# Patient Record
Sex: FEMALE | Race: WHITE | NOT HISPANIC OR LATINO
[De-identification: names, ages, dates, MRNs, and addresses within clinical notes are randomized per-mention and may not be internally consistent; named-entity substitution may affect disease eponyms.]

---

## 2017-03-20 ENCOUNTER — TRANSCRIPTION ENCOUNTER (OUTPATIENT)
Age: 32
End: 2017-03-20

## 2017-03-24 ENCOUNTER — TRANSCRIPTION ENCOUNTER (OUTPATIENT)
Age: 32
End: 2017-03-24

## 2020-03-05 ENCOUNTER — TRANSCRIPTION ENCOUNTER (OUTPATIENT)
Age: 35
End: 2020-03-05

## 2020-07-29 ENCOUNTER — TRANSCRIPTION ENCOUNTER (OUTPATIENT)
Age: 35
End: 2020-07-29

## 2020-08-13 ENCOUNTER — TRANSCRIPTION ENCOUNTER (OUTPATIENT)
Age: 35
End: 2020-08-13

## 2020-09-20 ENCOUNTER — TRANSCRIPTION ENCOUNTER (OUTPATIENT)
Age: 35
End: 2020-09-20

## 2020-10-23 ENCOUNTER — TRANSCRIPTION ENCOUNTER (OUTPATIENT)
Age: 35
End: 2020-10-23

## 2020-12-10 ENCOUNTER — TRANSCRIPTION ENCOUNTER (OUTPATIENT)
Age: 35
End: 2020-12-10

## 2020-12-19 ENCOUNTER — TRANSCRIPTION ENCOUNTER (OUTPATIENT)
Age: 35
End: 2020-12-19

## 2021-02-27 ENCOUNTER — TRANSCRIPTION ENCOUNTER (OUTPATIENT)
Age: 36
End: 2021-02-27

## 2021-03-25 PROBLEM — Z00.00 ENCOUNTER FOR PREVENTIVE HEALTH EXAMINATION: Status: ACTIVE | Noted: 2021-03-25

## 2021-04-05 ENCOUNTER — APPOINTMENT (OUTPATIENT)
Dept: OBGYN | Facility: CLINIC | Age: 36
End: 2021-04-05
Payer: COMMERCIAL

## 2021-04-05 VITALS
DIASTOLIC BLOOD PRESSURE: 78 MMHG | WEIGHT: 155 LBS | HEART RATE: 66 BPM | BODY MASS INDEX: 22.19 KG/M2 | SYSTOLIC BLOOD PRESSURE: 115 MMHG | HEIGHT: 70 IN

## 2021-04-05 DIAGNOSIS — Z31.69 ENCOUNTER FOR OTHER GENERAL COUNSELING AND ADVICE ON PROCREATION: ICD-10-CM

## 2021-04-05 DIAGNOSIS — B97.7 PAPILLOMAVIRUS AS THE CAUSE OF DISEASES CLASSIFIED ELSEWHERE: ICD-10-CM

## 2021-04-05 DIAGNOSIS — A60.00 HERPESVIRAL INFECTION OF UROGENITAL SYSTEM, UNSPECIFIED: ICD-10-CM

## 2021-04-05 DIAGNOSIS — Z72.0 TOBACCO USE: ICD-10-CM

## 2021-04-05 PROCEDURE — 99203 OFFICE O/P NEW LOW 30 MIN: CPT

## 2021-04-05 PROCEDURE — 99072 ADDL SUPL MATRL&STAF TM PHE: CPT

## 2021-04-05 RX ORDER — FAMOTIDINE 20 MG
TABLET ORAL
Refills: 0 | Status: ACTIVE | COMMUNITY

## 2021-04-17 PROBLEM — A60.00 GENITAL HERPES SIMPLEX, UNSPECIFIED SITE: Status: ACTIVE | Noted: 2021-04-17

## 2021-04-17 PROBLEM — Z31.69 ENCOUNTER FOR PRECONCEPTION CONSULTATION: Status: ACTIVE | Noted: 2021-04-17

## 2021-04-17 PROBLEM — B97.7 HIGH RISK HPV INFECTION: Status: ACTIVE | Noted: 2021-04-17

## 2021-06-10 ENCOUNTER — APPOINTMENT (OUTPATIENT)
Dept: ANTEPARTUM | Facility: CLINIC | Age: 36
End: 2021-06-10
Payer: COMMERCIAL

## 2021-06-10 ENCOUNTER — TRANSCRIPTION ENCOUNTER (OUTPATIENT)
Age: 36
End: 2021-06-10

## 2021-06-10 ENCOUNTER — ASOB RESULT (OUTPATIENT)
Age: 36
End: 2021-06-10

## 2021-06-10 PROCEDURE — 99072 ADDL SUPL MATRL&STAF TM PHE: CPT

## 2021-06-10 PROCEDURE — 76813 OB US NUCHAL MEAS 1 GEST: CPT

## 2021-07-21 ENCOUNTER — APPOINTMENT (OUTPATIENT)
Dept: ANTEPARTUM | Facility: CLINIC | Age: 36
End: 2021-07-21
Payer: COMMERCIAL

## 2021-07-21 ENCOUNTER — ASOB RESULT (OUTPATIENT)
Age: 36
End: 2021-07-21

## 2021-07-21 PROCEDURE — 99072 ADDL SUPL MATRL&STAF TM PHE: CPT

## 2021-07-21 PROCEDURE — 76811 OB US DETAILED SNGL FETUS: CPT

## 2021-08-12 ENCOUNTER — APPOINTMENT (OUTPATIENT)
Dept: ANTEPARTUM | Facility: CLINIC | Age: 36
End: 2021-08-12
Payer: COMMERCIAL

## 2021-08-12 ENCOUNTER — ASOB RESULT (OUTPATIENT)
Age: 36
End: 2021-08-12

## 2021-08-12 PROCEDURE — 76816 OB US FOLLOW-UP PER FETUS: CPT

## 2021-08-13 ENCOUNTER — APPOINTMENT (OUTPATIENT)
Dept: ANTEPARTUM | Facility: CLINIC | Age: 36
End: 2021-08-13
Payer: COMMERCIAL

## 2021-08-13 ENCOUNTER — ASOB RESULT (OUTPATIENT)
Age: 36
End: 2021-08-13

## 2021-08-13 PROCEDURE — 59000 AMNIOCENTESIS DIAGNOSTIC: CPT

## 2021-08-13 PROCEDURE — 76946 ECHO GUIDE FOR AMNIOCENTESIS: CPT

## 2021-09-09 ENCOUNTER — ASOB RESULT (OUTPATIENT)
Age: 36
End: 2021-09-09

## 2021-09-09 ENCOUNTER — APPOINTMENT (OUTPATIENT)
Dept: ANTEPARTUM | Facility: CLINIC | Age: 36
End: 2021-09-09
Payer: COMMERCIAL

## 2021-09-09 PROCEDURE — 76816 OB US FOLLOW-UP PER FETUS: CPT

## 2021-10-07 ENCOUNTER — ASOB RESULT (OUTPATIENT)
Age: 36
End: 2021-10-07

## 2021-10-07 ENCOUNTER — APPOINTMENT (OUTPATIENT)
Dept: ANTEPARTUM | Facility: CLINIC | Age: 36
End: 2021-10-07
Payer: COMMERCIAL

## 2021-10-07 PROCEDURE — 76816 OB US FOLLOW-UP PER FETUS: CPT

## 2021-11-04 ENCOUNTER — APPOINTMENT (OUTPATIENT)
Dept: ANTEPARTUM | Facility: CLINIC | Age: 36
End: 2021-11-04
Payer: COMMERCIAL

## 2021-11-04 ENCOUNTER — ASOB RESULT (OUTPATIENT)
Age: 36
End: 2021-11-04

## 2021-11-04 PROCEDURE — 76818 FETAL BIOPHYS PROFILE W/NST: CPT

## 2021-11-18 ENCOUNTER — ASOB RESULT (OUTPATIENT)
Age: 36
End: 2021-11-18

## 2021-11-18 ENCOUNTER — APPOINTMENT (OUTPATIENT)
Dept: ANTEPARTUM | Facility: CLINIC | Age: 36
End: 2021-11-18
Payer: COMMERCIAL

## 2021-11-18 PROCEDURE — 76819 FETAL BIOPHYS PROFIL W/O NST: CPT

## 2021-11-23 ENCOUNTER — APPOINTMENT (OUTPATIENT)
Dept: ANTEPARTUM | Facility: CLINIC | Age: 36
End: 2021-11-23
Payer: COMMERCIAL

## 2021-11-23 ENCOUNTER — ASOB RESULT (OUTPATIENT)
Age: 36
End: 2021-11-23

## 2021-11-23 PROCEDURE — 76818 FETAL BIOPHYS PROFILE W/NST: CPT

## 2021-11-30 ENCOUNTER — APPOINTMENT (OUTPATIENT)
Dept: ANTEPARTUM | Facility: CLINIC | Age: 36
End: 2021-11-30
Payer: COMMERCIAL

## 2021-11-30 ENCOUNTER — ASOB RESULT (OUTPATIENT)
Age: 36
End: 2021-11-30

## 2021-11-30 PROCEDURE — 76818 FETAL BIOPHYS PROFILE W/NST: CPT

## 2021-12-07 ENCOUNTER — ASOB RESULT (OUTPATIENT)
Age: 36
End: 2021-12-07

## 2021-12-07 ENCOUNTER — APPOINTMENT (OUTPATIENT)
Dept: ANTEPARTUM | Facility: CLINIC | Age: 36
End: 2021-12-07
Payer: COMMERCIAL

## 2021-12-07 PROCEDURE — 76819 FETAL BIOPHYS PROFIL W/O NST: CPT

## 2021-12-14 ENCOUNTER — ASOB RESULT (OUTPATIENT)
Age: 36
End: 2021-12-14

## 2021-12-14 ENCOUNTER — APPOINTMENT (OUTPATIENT)
Dept: ANTEPARTUM | Facility: CLINIC | Age: 36
End: 2021-12-14
Payer: COMMERCIAL

## 2021-12-14 PROCEDURE — 76818 FETAL BIOPHYS PROFILE W/NST: CPT

## 2021-12-17 ENCOUNTER — TRANSCRIPTION ENCOUNTER (OUTPATIENT)
Age: 36
End: 2021-12-17

## 2021-12-21 ENCOUNTER — APPOINTMENT (OUTPATIENT)
Dept: ANTEPARTUM | Facility: CLINIC | Age: 36
End: 2021-12-21

## 2021-12-22 ENCOUNTER — TRANSCRIPTION ENCOUNTER (OUTPATIENT)
Age: 36
End: 2021-12-22

## 2021-12-23 ENCOUNTER — RESULT REVIEW (OUTPATIENT)
Age: 36
End: 2021-12-23

## 2021-12-23 ENCOUNTER — APPOINTMENT (OUTPATIENT)
Age: 36
End: 2021-12-23

## 2021-12-23 ENCOUNTER — INPATIENT (INPATIENT)
Facility: HOSPITAL | Age: 36
LOS: 2 days | Discharge: HOME | End: 2021-12-26
Attending: OBSTETRICS & GYNECOLOGY | Admitting: OBSTETRICS & GYNECOLOGY
Payer: COMMERCIAL

## 2021-12-23 VITALS — HEIGHT: 69 IN | WEIGHT: 190.04 LBS

## 2021-12-23 LAB
ALBUMIN SERPL ELPH-MCNC: 4 G/DL — SIGNIFICANT CHANGE UP (ref 3.3–5)
ALBUMIN SERPL ELPH-MCNC: 4 G/DL — SIGNIFICANT CHANGE UP (ref 3.3–5)
ALP SERPL-CCNC: 181 U/L — HIGH (ref 40–120)
ALP SERPL-CCNC: SIGNIFICANT CHANGE UP (ref 40–120)
ALT FLD-CCNC: 81 U/L — HIGH (ref 10–45)
ALT FLD-CCNC: SIGNIFICANT CHANGE UP (ref 10–45)
ANION GAP SERPL CALC-SCNC: 14 MMOL/L — SIGNIFICANT CHANGE UP (ref 5–17)
ANION GAP SERPL CALC-SCNC: 16 MMOL/L — SIGNIFICANT CHANGE UP (ref 5–17)
APTT BLD: 29.2 SEC — SIGNIFICANT CHANGE UP (ref 27.5–35.5)
AST SERPL-CCNC: 85 U/L — HIGH (ref 10–40)
AST SERPL-CCNC: SIGNIFICANT CHANGE UP (ref 10–40)
BILIRUB SERPL-MCNC: 0.3 MG/DL — SIGNIFICANT CHANGE UP (ref 0.2–1.2)
BILIRUB SERPL-MCNC: 0.4 MG/DL — SIGNIFICANT CHANGE UP (ref 0.2–1.2)
BLD GP AB SCN SERPL QL: NEGATIVE — SIGNIFICANT CHANGE UP
BUN SERPL-MCNC: 8 MG/DL — SIGNIFICANT CHANGE UP (ref 7–23)
BUN SERPL-MCNC: 9 MG/DL — SIGNIFICANT CHANGE UP (ref 7–23)
CALCIUM SERPL-MCNC: 9.3 MG/DL — SIGNIFICANT CHANGE UP (ref 8.4–10.5)
CALCIUM SERPL-MCNC: 9.4 MG/DL — SIGNIFICANT CHANGE UP (ref 8.4–10.5)
CHLORIDE SERPL-SCNC: 101 MMOL/L — SIGNIFICANT CHANGE UP (ref 96–108)
CHLORIDE SERPL-SCNC: 102 MMOL/L — SIGNIFICANT CHANGE UP (ref 96–108)
CO2 SERPL-SCNC: 20 MMOL/L — LOW (ref 22–31)
CO2 SERPL-SCNC: 21 MMOL/L — LOW (ref 22–31)
CREAT ?TM UR-MCNC: 37 MG/DL — SIGNIFICANT CHANGE UP
CREAT SERPL-MCNC: 0.78 MG/DL — SIGNIFICANT CHANGE UP (ref 0.5–1.3)
CREAT SERPL-MCNC: 0.82 MG/DL — SIGNIFICANT CHANGE UP (ref 0.5–1.3)
FIBRINOGEN PPP-MCNC: 439 MG/DL — HIGH (ref 258–438)
GLUCOSE SERPL-MCNC: 67 MG/DL — LOW (ref 70–99)
GLUCOSE SERPL-MCNC: 70 MG/DL — SIGNIFICANT CHANGE UP (ref 70–99)
HCT VFR BLD CALC: 40.4 % — SIGNIFICANT CHANGE UP (ref 34.5–45)
HGB BLD-MCNC: 13.8 G/DL — SIGNIFICANT CHANGE UP (ref 11.5–15.5)
INR BLD: 0.85 — LOW (ref 0.88–1.16)
LDH SERPL L TO P-CCNC: SIGNIFICANT CHANGE UP (ref 50–242)
MCHC RBC-ENTMCNC: 30.9 PG — SIGNIFICANT CHANGE UP (ref 27–34)
MCHC RBC-ENTMCNC: 34.2 GM/DL — SIGNIFICANT CHANGE UP (ref 32–36)
MCV RBC AUTO: 90.4 FL — SIGNIFICANT CHANGE UP (ref 80–100)
NRBC # BLD: 0 /100 WBCS — SIGNIFICANT CHANGE UP (ref 0–0)
PLATELET # BLD AUTO: 227 K/UL — SIGNIFICANT CHANGE UP (ref 150–400)
POTASSIUM SERPL-MCNC: 4 MMOL/L — SIGNIFICANT CHANGE UP (ref 3.5–5.3)
POTASSIUM SERPL-MCNC: SIGNIFICANT CHANGE UP (ref 3.5–5.3)
POTASSIUM SERPL-SCNC: 4 MMOL/L — SIGNIFICANT CHANGE UP (ref 3.5–5.3)
POTASSIUM SERPL-SCNC: SIGNIFICANT CHANGE UP (ref 3.5–5.3)
PROT ?TM UR-MCNC: <4 MG/DL — SIGNIFICANT CHANGE UP (ref 0–12)
PROT ?TM UR-MCNC: <4 MG/DL — SIGNIFICANT CHANGE UP (ref 0–12)
PROT SERPL-MCNC: 7.1 G/DL — SIGNIFICANT CHANGE UP (ref 6–8.3)
PROT SERPL-MCNC: 7.3 G/DL — SIGNIFICANT CHANGE UP (ref 6–8.3)
PROT/CREAT UR-RTO: SIGNIFICANT CHANGE UP (ref 0–0.2)
PROTHROM AB SERPL-ACNC: 10.3 SEC — LOW (ref 10.6–13.6)
RBC # BLD: 4.47 M/UL — SIGNIFICANT CHANGE UP (ref 3.8–5.2)
RBC # FLD: 12.9 % — SIGNIFICANT CHANGE UP (ref 10.3–14.5)
RH IG SCN BLD-IMP: POSITIVE — SIGNIFICANT CHANGE UP
SODIUM SERPL-SCNC: 136 MMOL/L — SIGNIFICANT CHANGE UP (ref 135–145)
SODIUM SERPL-SCNC: 138 MMOL/L — SIGNIFICANT CHANGE UP (ref 135–145)
URATE SERPL-MCNC: 5.5 MG/DL — SIGNIFICANT CHANGE UP (ref 2.5–7)
WBC # BLD: 7.2 K/UL — SIGNIFICANT CHANGE UP (ref 3.8–10.5)
WBC # FLD AUTO: 7.2 K/UL — SIGNIFICANT CHANGE UP (ref 3.8–10.5)

## 2021-12-23 PROCEDURE — 88307 TISSUE EXAM BY PATHOLOGIST: CPT | Mod: 26

## 2021-12-23 RX ORDER — MAGNESIUM SULFATE 500 MG/ML
4 VIAL (ML) INJECTION ONCE
Refills: 0 | Status: DISCONTINUED | OUTPATIENT
Start: 2021-12-23 | End: 2021-12-23

## 2021-12-23 RX ORDER — ENOXAPARIN SODIUM 100 MG/ML
40 INJECTION SUBCUTANEOUS EVERY 24 HOURS
Refills: 0 | Status: DISCONTINUED | OUTPATIENT
Start: 2021-12-24 | End: 2021-12-26

## 2021-12-23 RX ORDER — SODIUM CHLORIDE 9 MG/ML
1000 INJECTION, SOLUTION INTRAVENOUS
Refills: 0 | Status: DISCONTINUED | OUTPATIENT
Start: 2021-12-23 | End: 2021-12-23

## 2021-12-23 RX ORDER — OXYTOCIN 10 UNIT/ML
333.33 VIAL (ML) INJECTION
Qty: 20 | Refills: 0 | Status: DISCONTINUED | OUTPATIENT
Start: 2021-12-23 | End: 2021-12-23

## 2021-12-23 RX ORDER — CHLORHEXIDINE GLUCONATE 213 G/1000ML
1 SOLUTION TOPICAL DAILY
Refills: 0 | Status: DISCONTINUED | OUTPATIENT
Start: 2021-12-23 | End: 2021-12-23

## 2021-12-23 RX ORDER — AMPICILLIN TRIHYDRATE 250 MG
2 CAPSULE ORAL ONCE
Refills: 0 | Status: DISCONTINUED | OUTPATIENT
Start: 2021-12-23 | End: 2021-12-23

## 2021-12-23 RX ORDER — OXYTOCIN 10 UNIT/ML
333.33 VIAL (ML) INJECTION
Qty: 20 | Refills: 0 | Status: DISCONTINUED | OUTPATIENT
Start: 2021-12-23 | End: 2021-12-26

## 2021-12-23 RX ORDER — CITRIC ACID/SODIUM CITRATE 300-500 MG
15 SOLUTION, ORAL ORAL EVERY 6 HOURS
Refills: 0 | Status: DISCONTINUED | OUTPATIENT
Start: 2021-12-23 | End: 2021-12-23

## 2021-12-23 RX ORDER — SODIUM CHLORIDE 9 MG/ML
1000 INJECTION, SOLUTION INTRAVENOUS ONCE
Refills: 0 | Status: DISCONTINUED | OUTPATIENT
Start: 2021-12-23 | End: 2021-12-23

## 2021-12-23 RX ORDER — IBUPROFEN 200 MG
600 TABLET ORAL EVERY 6 HOURS
Refills: 0 | Status: COMPLETED | OUTPATIENT
Start: 2021-12-23 | End: 2022-11-21

## 2021-12-23 RX ORDER — OXYCODONE HYDROCHLORIDE 5 MG/1
5 TABLET ORAL
Refills: 0 | Status: COMPLETED | OUTPATIENT
Start: 2021-12-23 | End: 2021-12-30

## 2021-12-23 RX ORDER — NALOXONE HYDROCHLORIDE 4 MG/.1ML
0.1 SPRAY NASAL
Refills: 0 | Status: DISCONTINUED | OUTPATIENT
Start: 2021-12-23 | End: 2021-12-23

## 2021-12-23 RX ORDER — SIMETHICONE 80 MG/1
80 TABLET, CHEWABLE ORAL EVERY 4 HOURS
Refills: 0 | Status: DISCONTINUED | OUTPATIENT
Start: 2021-12-23 | End: 2021-12-26

## 2021-12-23 RX ORDER — MAGNESIUM SULFATE 500 MG/ML
2 VIAL (ML) INJECTION
Qty: 40 | Refills: 0 | Status: DISCONTINUED | OUTPATIENT
Start: 2021-12-23 | End: 2021-12-24

## 2021-12-23 RX ORDER — TETANUS TOXOID, REDUCED DIPHTHERIA TOXOID AND ACELLULAR PERTUSSIS VACCINE, ADSORBED 5; 2.5; 8; 8; 2.5 [IU]/.5ML; [IU]/.5ML; UG/.5ML; UG/.5ML; UG/.5ML
0.5 SUSPENSION INTRAMUSCULAR ONCE
Refills: 0 | Status: DISCONTINUED | OUTPATIENT
Start: 2021-12-23 | End: 2021-12-26

## 2021-12-23 RX ORDER — ACETAMINOPHEN 500 MG
975 TABLET ORAL
Refills: 0 | Status: DISCONTINUED | OUTPATIENT
Start: 2021-12-23 | End: 2021-12-26

## 2021-12-23 RX ORDER — CEFAZOLIN SODIUM 1 G
2000 VIAL (EA) INJECTION ONCE
Refills: 0 | Status: COMPLETED | OUTPATIENT
Start: 2021-12-23 | End: 2021-12-23

## 2021-12-23 RX ORDER — SODIUM CHLORIDE 9 MG/ML
1000 INJECTION, SOLUTION INTRAVENOUS
Refills: 0 | Status: DISCONTINUED | OUTPATIENT
Start: 2021-12-23 | End: 2021-12-26

## 2021-12-23 RX ORDER — MAGNESIUM HYDROXIDE 400 MG/1
30 TABLET, CHEWABLE ORAL
Refills: 0 | Status: DISCONTINUED | OUTPATIENT
Start: 2021-12-23 | End: 2021-12-24

## 2021-12-23 RX ORDER — FAMOTIDINE 10 MG/ML
20 INJECTION INTRAVENOUS ONCE
Refills: 0 | Status: COMPLETED | OUTPATIENT
Start: 2021-12-23 | End: 2021-12-23

## 2021-12-23 RX ORDER — MORPHINE SULFATE 50 MG/1
0.2 CAPSULE, EXTENDED RELEASE ORAL ONCE
Refills: 0 | Status: DISCONTINUED | OUTPATIENT
Start: 2021-12-23 | End: 2021-12-23

## 2021-12-23 RX ORDER — CITRIC ACID/SODIUM CITRATE 300-500 MG
30 SOLUTION, ORAL ORAL ONCE
Refills: 0 | Status: COMPLETED | OUTPATIENT
Start: 2021-12-23 | End: 2021-12-23

## 2021-12-23 RX ORDER — DEXAMETHASONE 0.5 MG/5ML
4 ELIXIR ORAL EVERY 6 HOURS
Refills: 0 | Status: DISCONTINUED | OUTPATIENT
Start: 2021-12-23 | End: 2021-12-23

## 2021-12-23 RX ORDER — OXYCODONE HYDROCHLORIDE 5 MG/1
5 TABLET ORAL ONCE
Refills: 0 | Status: DISCONTINUED | OUTPATIENT
Start: 2021-12-23 | End: 2021-12-26

## 2021-12-23 RX ORDER — MAGNESIUM SULFATE 500 MG/ML
2 VIAL (ML) INJECTION
Qty: 40 | Refills: 0 | Status: DISCONTINUED | OUTPATIENT
Start: 2021-12-23 | End: 2021-12-23

## 2021-12-23 RX ORDER — KETOROLAC TROMETHAMINE 30 MG/ML
30 SYRINGE (ML) INJECTION EVERY 6 HOURS
Refills: 0 | Status: DISCONTINUED | OUTPATIENT
Start: 2021-12-23 | End: 2021-12-24

## 2021-12-23 RX ORDER — DIPHENHYDRAMINE HCL 50 MG
25 CAPSULE ORAL EVERY 6 HOURS
Refills: 0 | Status: DISCONTINUED | OUTPATIENT
Start: 2021-12-23 | End: 2021-12-26

## 2021-12-23 RX ORDER — AMPICILLIN TRIHYDRATE 250 MG
1 CAPSULE ORAL EVERY 4 HOURS
Refills: 0 | Status: DISCONTINUED | OUTPATIENT
Start: 2021-12-23 | End: 2021-12-23

## 2021-12-23 RX ORDER — ONDANSETRON 8 MG/1
4 TABLET, FILM COATED ORAL EVERY 6 HOURS
Refills: 0 | Status: DISCONTINUED | OUTPATIENT
Start: 2021-12-23 | End: 2021-12-23

## 2021-12-23 RX ORDER — LANOLIN
1 OINTMENT (GRAM) TOPICAL EVERY 6 HOURS
Refills: 0 | Status: DISCONTINUED | OUTPATIENT
Start: 2021-12-23 | End: 2021-12-26

## 2021-12-23 RX ADMIN — Medication 100 MILLIGRAM(S): at 18:25

## 2021-12-23 RX ADMIN — FAMOTIDINE 20 MILLIGRAM(S): 10 INJECTION INTRAVENOUS at 18:24

## 2021-12-23 RX ADMIN — Medication 975 MILLIGRAM(S): at 22:40

## 2021-12-23 RX ADMIN — Medication 975 MILLIGRAM(S): at 23:10

## 2021-12-23 RX ADMIN — Medication 30 MILLILITER(S): at 18:24

## 2021-12-23 RX ADMIN — Medication 30 MILLIGRAM(S): at 21:24

## 2021-12-23 RX ADMIN — Medication 30 MILLIGRAM(S): at 21:45

## 2021-12-23 RX ADMIN — Medication 50 GM/HR: at 20:34

## 2021-12-23 NOTE — PATIENT PROFILE OB - FALL HARM RISK - UNIVERSAL INTERVENTIONS
Bed in lowest position, wheels locked, appropriate side rails in place/Call bell, personal items and telephone in reach/Instruct patient to call for assistance before getting out of bed or chair/Non-slip footwear when patient is out of bed/[place] to call system/Physically safe environment - no spills, clutter or unnecessary equipment/Purposeful Proactive Rounding/Room/bathroom lighting operational, light cord in reach

## 2021-12-24 LAB
ALBUMIN SERPL ELPH-MCNC: 3.1 G/DL — LOW (ref 3.3–5)
ALP SERPL-CCNC: 129 U/L — HIGH (ref 40–120)
ALT FLD-CCNC: 60 U/L — HIGH (ref 10–45)
ANION GAP SERPL CALC-SCNC: 8 MMOL/L — SIGNIFICANT CHANGE UP (ref 5–17)
AST SERPL-CCNC: 76 U/L — HIGH (ref 10–40)
BASOPHILS # BLD AUTO: 0.03 K/UL — SIGNIFICANT CHANGE UP (ref 0–0.2)
BASOPHILS NFR BLD AUTO: 0.3 % — SIGNIFICANT CHANGE UP (ref 0–2)
BILIRUB SERPL-MCNC: 0.4 MG/DL — SIGNIFICANT CHANGE UP (ref 0.2–1.2)
BUN SERPL-MCNC: 7 MG/DL — SIGNIFICANT CHANGE UP (ref 7–23)
CALCIUM SERPL-MCNC: 7.5 MG/DL — LOW (ref 8.4–10.5)
CHLORIDE SERPL-SCNC: 99 MMOL/L — SIGNIFICANT CHANGE UP (ref 96–108)
CO2 SERPL-SCNC: 26 MMOL/L — SIGNIFICANT CHANGE UP (ref 22–31)
COVID-19 SPIKE DOMAIN AB INTERP: POSITIVE
COVID-19 SPIKE DOMAIN ANTIBODY RESULT: >250 U/ML — HIGH
CREAT SERPL-MCNC: 0.8 MG/DL — SIGNIFICANT CHANGE UP (ref 0.5–1.3)
EOSINOPHIL # BLD AUTO: 0.07 K/UL — SIGNIFICANT CHANGE UP (ref 0–0.5)
EOSINOPHIL NFR BLD AUTO: 0.8 % — SIGNIFICANT CHANGE UP (ref 0–6)
GLUCOSE SERPL-MCNC: 75 MG/DL — SIGNIFICANT CHANGE UP (ref 70–99)
HCT VFR BLD CALC: 33.8 % — LOW (ref 34.5–45)
HGB BLD-MCNC: 11.7 G/DL — SIGNIFICANT CHANGE UP (ref 11.5–15.5)
IMM GRANULOCYTES NFR BLD AUTO: 0.5 % — SIGNIFICANT CHANGE UP (ref 0–1.5)
LYMPHOCYTES # BLD AUTO: 1.2 K/UL — SIGNIFICANT CHANGE UP (ref 1–3.3)
LYMPHOCYTES # BLD AUTO: 13.5 % — SIGNIFICANT CHANGE UP (ref 13–44)
MAGNESIUM SERPL-MCNC: 4.5 MG/DL — HIGH (ref 1.6–2.6)
MAGNESIUM SERPL-MCNC: 5.7 MG/DL — HIGH (ref 1.6–2.6)
MAGNESIUM SERPL-MCNC: 6.1 MG/DL — HIGH (ref 1.6–2.6)
MCHC RBC-ENTMCNC: 31 PG — SIGNIFICANT CHANGE UP (ref 27–34)
MCHC RBC-ENTMCNC: 34.6 GM/DL — SIGNIFICANT CHANGE UP (ref 32–36)
MCV RBC AUTO: 89.4 FL — SIGNIFICANT CHANGE UP (ref 80–100)
MONOCYTES # BLD AUTO: 0.67 K/UL — SIGNIFICANT CHANGE UP (ref 0–0.9)
MONOCYTES NFR BLD AUTO: 7.6 % — SIGNIFICANT CHANGE UP (ref 2–14)
NEUTROPHILS # BLD AUTO: 6.85 K/UL — SIGNIFICANT CHANGE UP (ref 1.8–7.4)
NEUTROPHILS NFR BLD AUTO: 77.3 % — HIGH (ref 43–77)
NRBC # BLD: 0 /100 WBCS — SIGNIFICANT CHANGE UP (ref 0–0)
PLATELET # BLD AUTO: 187 K/UL — SIGNIFICANT CHANGE UP (ref 150–400)
POTASSIUM SERPL-MCNC: 3.7 MMOL/L — SIGNIFICANT CHANGE UP (ref 3.5–5.3)
POTASSIUM SERPL-SCNC: 3.7 MMOL/L — SIGNIFICANT CHANGE UP (ref 3.5–5.3)
PROT SERPL-MCNC: 5.6 G/DL — LOW (ref 6–8.3)
RBC # BLD: 3.78 M/UL — LOW (ref 3.8–5.2)
RBC # FLD: 13 % — SIGNIFICANT CHANGE UP (ref 10.3–14.5)
SARS-COV-2 IGG+IGM SERPL QL IA: >250 U/ML — HIGH
SARS-COV-2 IGG+IGM SERPL QL IA: POSITIVE
SODIUM SERPL-SCNC: 133 MMOL/L — LOW (ref 135–145)
T PALLIDUM AB TITR SER: NEGATIVE — SIGNIFICANT CHANGE UP
WBC # BLD: 8.86 K/UL — SIGNIFICANT CHANGE UP (ref 3.8–10.5)
WBC # FLD AUTO: 8.86 K/UL — SIGNIFICANT CHANGE UP (ref 3.8–10.5)

## 2021-12-24 RX ORDER — IBUPROFEN 200 MG
600 TABLET ORAL EVERY 6 HOURS
Refills: 0 | Status: DISCONTINUED | OUTPATIENT
Start: 2021-12-24 | End: 2021-12-26

## 2021-12-24 RX ORDER — OXYCODONE HYDROCHLORIDE 5 MG/1
5 TABLET ORAL
Refills: 0 | Status: DISCONTINUED | OUTPATIENT
Start: 2021-12-24 | End: 2021-12-26

## 2021-12-24 RX ADMIN — Medication 30 MILLIGRAM(S): at 09:46

## 2021-12-24 RX ADMIN — OXYCODONE HYDROCHLORIDE 5 MILLIGRAM(S): 5 TABLET ORAL at 10:51

## 2021-12-24 RX ADMIN — OXYCODONE HYDROCHLORIDE 5 MILLIGRAM(S): 5 TABLET ORAL at 21:55

## 2021-12-24 RX ADMIN — Medication 975 MILLIGRAM(S): at 17:07

## 2021-12-24 RX ADMIN — Medication 975 MILLIGRAM(S): at 13:00

## 2021-12-24 RX ADMIN — Medication 1 APPLICATION(S): at 05:17

## 2021-12-24 RX ADMIN — Medication 30 MILLIGRAM(S): at 03:38

## 2021-12-24 RX ADMIN — Medication 30 MILLIGRAM(S): at 02:45

## 2021-12-24 RX ADMIN — ENOXAPARIN SODIUM 40 MILLIGRAM(S): 100 INJECTION SUBCUTANEOUS at 05:17

## 2021-12-24 RX ADMIN — OXYCODONE HYDROCHLORIDE 5 MILLIGRAM(S): 5 TABLET ORAL at 11:45

## 2021-12-24 RX ADMIN — Medication 975 MILLIGRAM(S): at 05:18

## 2021-12-24 RX ADMIN — OXYCODONE HYDROCHLORIDE 5 MILLIGRAM(S): 5 TABLET ORAL at 01:44

## 2021-12-24 RX ADMIN — Medication 975 MILLIGRAM(S): at 12:27

## 2021-12-24 RX ADMIN — Medication 975 MILLIGRAM(S): at 23:42

## 2021-12-24 RX ADMIN — Medication 600 MILLIGRAM(S): at 20:48

## 2021-12-24 RX ADMIN — Medication 30 MILLIGRAM(S): at 15:35

## 2021-12-24 RX ADMIN — Medication 30 MILLIGRAM(S): at 09:16

## 2021-12-24 RX ADMIN — Medication 975 MILLIGRAM(S): at 06:00

## 2021-12-24 RX ADMIN — Medication 600 MILLIGRAM(S): at 21:20

## 2021-12-24 RX ADMIN — OXYCODONE HYDROCHLORIDE 5 MILLIGRAM(S): 5 TABLET ORAL at 22:32

## 2021-12-24 RX ADMIN — Medication 30 MILLIGRAM(S): at 15:01

## 2021-12-24 RX ADMIN — OXYCODONE HYDROCHLORIDE 5 MILLIGRAM(S): 5 TABLET ORAL at 02:38

## 2021-12-24 RX ADMIN — OXYCODONE HYDROCHLORIDE 5 MILLIGRAM(S): 5 TABLET ORAL at 17:07

## 2021-12-24 NOTE — PROGRESS NOTE ADULT - ASSESSMENT
A&P:   36y  s/p C/S complicated by preeclampsia with severe features      1. Preeclampsia: Continue IV Magnesium @2G/hr for 24 hrs post delivery.  No complaints currently.   Antihypertensives: none  Continue to monitor blood pressures  Next Magnesium check @ 830  Follow up on Magnesium serum level     2. GI: ADAT  3. : strict Is and Os, D/C barry after discontinuation of IV Magnesium

## 2021-12-24 NOTE — CHART NOTE - NSCHARTNOTEFT_GEN_A_CORE
Patient evaluated at bedside for clinical magnesium check.     She denies visual disturbances including scotoma, headache and right upper quadrant pain. Also denies nausea/vomiting/epigastric pain/shortness of breath. Pain well controlled.      T(C): 36.7 (12-24-21 @ 06:54), Max: 36.7 (12-24-21 @ 06:54)  HR: 77 (12-24-21 @ 15:00) (63 - 77)  BP: 117/77 (12-24-21 @ 15:00) (117/77 - 148/86)  RR: 18 (12-24-21 @ 15:00) (18 - 19)  SpO2: 97% (12-24-21 @ 15:00) (97% - 99%)  Wt(kg): --    Gen: NAD  Pulm: CTAB  Abd: soft, nontender, no rebound or guarding, no epigastric tenderness, liver nonpalpable +BS, fundus palpated   : Rosenberg in place  Ext: Reflexes +1                          11.7   8.86  )-----------( 187      ( 24 Dec 2021 09:35 )             33.8     12-24    133<L>  |  99  |  7   ----------------------------<  75  3.7   |  26  |  0.80    Ca    7.5<L>      24 Dec 2021 09:35  Mg     5.7     12-24    TPro  5.6<L>  /  Alb  3.1<L>  /  TBili  0.4  /  DBili  x   /  AST  76<H>  /  ALT  60<H>  /  AlkPhos  129<H>  12-24 12-23-21 @ 07:01  -  12-24-21 @ 07:00  --------------------------------------------------------  IN: 4575 mL / OUT: 2466 mL / NET: 2109 mL    12-24-21 @ 07:01  -  12-24-21 @ 15:36  --------------------------------------------------------  IN: 900 mL / OUT: 3200 mL / NET: -2300 mL        A&P:   36y  s/p pC/S   complicated by preeclampsia with severe features      1. Preeclampsia: Continue IV Magnesium @2G/hr for 24 hrs post delivery.  No complaints currently.   Continue to monitor blood pressures  D/c Magnesium at 20:30, no more mag checks  Magnesium serum level 5.7    2. GI: Clears, until Magnesium is stopped    3. : strict Is and Os, D/C asha after discontinuation of IV Magnesium

## 2021-12-24 NOTE — LACTATION INITIAL EVALUATION - NS LACT CON GESTATIONAL DIAB
2nd attempt to call regarding COVID-19 Virtual Care Program. When someone answered the phone there was no response. Will not attempt call again.    
diet controlled

## 2021-12-24 NOTE — PROGRESS NOTE ADULT - ASSESSMENT
A&P:   36y  s/p pC/S  complicated by preeclampsia with severe features      1. Preeclampsia: Continue IV Magnesium @2G/hr for 24 hrs post delivery.  No complaints currently.   Continue to monitor blood pressures  Next Magnesium check 14:30  Follow up on Magnesium serum level and full labs    2. GI: Clears, until Magnesium is stopped    3. : strict Is and Os, D/C barry after discontinuation of IV Magnesium

## 2021-12-24 NOTE — LACTATION INITIAL EVALUATION - LACTATION INTERVENTIONS
initiate/review safe skin-to-skin/initiate/review hand expression/initiate/review techniques for position and latch/post discharge community resources provided/review techniques to increase milk supply/review techniques to manage sore nipples/engorgement/reviewed components of an effective feeding and at least 8 effective feedings per day required/reviewed importance of monitoring infant diapers, the breastfeeding log, and minimum output each day/reviewed risks of unnecessary formula supplementation/reviewed risks of artificial nipples/reviewed benefits and recommendations for rooming in/reviewed feeding on demand/by cue at least 8 times a day/reviewed indications of inadequate milk transfer that would require supplementation

## 2021-12-24 NOTE — LACTATION INITIAL EVALUATION - NS LACT CON REASON FOR REQ
40 wk gestation baby, about 18 hrs old at this time. Placed the baby STS with the mother while I provided breastfeeding education and explained normal  behaviour and the milk production feedback system. Assisted with positioning in a football and cross cradle hold and taught latch strategies. Baby was able to latch deeply and is feeding well, rhythmically sucking between short pauses of rest. Mother to continue with STS when possible, room-in, and feed as per cues at least 8-12x/ day. To f/u as needed./c/o sore, painful nipples/primaparous mom/staff request/patient request

## 2021-12-25 RX ORDER — POLYETHYLENE GLYCOL 3350 17 G/17G
17 POWDER, FOR SOLUTION ORAL EVERY 24 HOURS
Refills: 0 | Status: DISCONTINUED | OUTPATIENT
Start: 2021-12-25 | End: 2021-12-26

## 2021-12-25 RX ADMIN — POLYETHYLENE GLYCOL 3350 17 GRAM(S): 17 POWDER, FOR SOLUTION ORAL at 20:34

## 2021-12-25 RX ADMIN — ENOXAPARIN SODIUM 40 MILLIGRAM(S): 100 INJECTION SUBCUTANEOUS at 09:46

## 2021-12-25 RX ADMIN — Medication 975 MILLIGRAM(S): at 18:15

## 2021-12-25 RX ADMIN — Medication 600 MILLIGRAM(S): at 03:35

## 2021-12-25 RX ADMIN — Medication 975 MILLIGRAM(S): at 17:23

## 2021-12-25 RX ADMIN — Medication 975 MILLIGRAM(S): at 11:49

## 2021-12-25 RX ADMIN — Medication 975 MILLIGRAM(S): at 05:50

## 2021-12-25 RX ADMIN — Medication 600 MILLIGRAM(S): at 20:34

## 2021-12-25 RX ADMIN — Medication 975 MILLIGRAM(S): at 23:50

## 2021-12-25 RX ADMIN — Medication 600 MILLIGRAM(S): at 15:30

## 2021-12-25 RX ADMIN — OXYCODONE HYDROCHLORIDE 5 MILLIGRAM(S): 5 TABLET ORAL at 01:18

## 2021-12-25 RX ADMIN — Medication 600 MILLIGRAM(S): at 21:00

## 2021-12-25 RX ADMIN — Medication 975 MILLIGRAM(S): at 06:20

## 2021-12-25 RX ADMIN — Medication 600 MILLIGRAM(S): at 09:46

## 2021-12-25 RX ADMIN — Medication 975 MILLIGRAM(S): at 00:10

## 2021-12-25 RX ADMIN — Medication 975 MILLIGRAM(S): at 12:30

## 2021-12-25 RX ADMIN — Medication 975 MILLIGRAM(S): at 23:23

## 2021-12-25 RX ADMIN — Medication 600 MILLIGRAM(S): at 10:30

## 2021-12-25 RX ADMIN — Medication 600 MILLIGRAM(S): at 14:44

## 2021-12-25 RX ADMIN — Medication 600 MILLIGRAM(S): at 03:05

## 2021-12-25 NOTE — PROGRESS NOTE ADULT - ASSESSMENT
A/P: 36y s/p  section c/b PEC w/ SF and COVID, POD #2 stable  - PEC w/ SF: normotensive, no toxic complaints, ctm   -COVID: Asymptomatic, CTABL, AF, ctm  -  Pain: PO motrin/acetaminophen, oxycodone for severe pain PRN  -  Post-operatively labs: post-op Hgb stable  -  GI: tolerating regular diet, passing gas  -  : voiding spontaneously  -  DVT prophylaxis: ambulation, SCDs, Lovenox  -  Dispo: POD 3 or 4 A/P: 36y s/p  section c/b PEC w/ SF and COVID, POD #1 stable  - PEC w/ SF: normotensive, no toxic complaints, ctm   -COVID: Asymptomatic, CTABL, AF, ctm  -  Pain: PO motrin/acetaminophen, oxycodone for severe pain PRN  -  Post-operatively labs: post-op Hgb stable  -  GI: tolerating regular diet, passing gas  -  : voiding spontaneously  -  DVT prophylaxis: ambulation, SCDs, Lovenox  -  Dispo: POD 3 or 4

## 2021-12-26 ENCOUNTER — TRANSCRIPTION ENCOUNTER (OUTPATIENT)
Age: 36
End: 2021-12-26

## 2021-12-26 VITALS
SYSTOLIC BLOOD PRESSURE: 110 MMHG | OXYGEN SATURATION: 97 % | TEMPERATURE: 98 F | DIASTOLIC BLOOD PRESSURE: 70 MMHG | HEART RATE: 85 BPM | RESPIRATION RATE: 18 BRPM

## 2021-12-26 PROCEDURE — 85610 PROTHROMBIN TIME: CPT

## 2021-12-26 PROCEDURE — 86900 BLOOD TYPING SEROLOGIC ABO: CPT

## 2021-12-26 PROCEDURE — 85025 COMPLETE CBC W/AUTO DIFF WBC: CPT

## 2021-12-26 PROCEDURE — 85027 COMPLETE CBC AUTOMATED: CPT

## 2021-12-26 PROCEDURE — 84550 ASSAY OF BLOOD/URIC ACID: CPT

## 2021-12-26 PROCEDURE — 86850 RBC ANTIBODY SCREEN: CPT

## 2021-12-26 PROCEDURE — 86901 BLOOD TYPING SEROLOGIC RH(D): CPT

## 2021-12-26 PROCEDURE — 88307 TISSUE EXAM BY PATHOLOGIST: CPT

## 2021-12-26 PROCEDURE — 80053 COMPREHEN METABOLIC PANEL: CPT

## 2021-12-26 PROCEDURE — 82570 ASSAY OF URINE CREATININE: CPT

## 2021-12-26 PROCEDURE — 59050 FETAL MONITOR W/REPORT: CPT

## 2021-12-26 PROCEDURE — 84156 ASSAY OF PROTEIN URINE: CPT

## 2021-12-26 PROCEDURE — 85384 FIBRINOGEN ACTIVITY: CPT

## 2021-12-26 PROCEDURE — 83615 LACTATE (LD) (LDH) ENZYME: CPT

## 2021-12-26 PROCEDURE — 86780 TREPONEMA PALLIDUM: CPT

## 2021-12-26 PROCEDURE — 36415 COLL VENOUS BLD VENIPUNCTURE: CPT

## 2021-12-26 PROCEDURE — 83735 ASSAY OF MAGNESIUM: CPT

## 2021-12-26 PROCEDURE — 85730 THROMBOPLASTIN TIME PARTIAL: CPT

## 2021-12-26 PROCEDURE — 86769 SARS-COV-2 COVID-19 ANTIBODY: CPT

## 2021-12-26 RX ORDER — IBUPROFEN 200 MG
1 TABLET ORAL
Qty: 0 | Refills: 0 | DISCHARGE
Start: 2021-12-26

## 2021-12-26 RX ORDER — ACETAMINOPHEN 500 MG
3 TABLET ORAL
Qty: 0 | Refills: 0 | DISCHARGE
Start: 2021-12-26

## 2021-12-26 RX ADMIN — Medication 975 MILLIGRAM(S): at 06:45

## 2021-12-26 RX ADMIN — Medication 600 MILLIGRAM(S): at 03:20

## 2021-12-26 RX ADMIN — Medication 600 MILLIGRAM(S): at 09:42

## 2021-12-26 RX ADMIN — Medication 600 MILLIGRAM(S): at 02:51

## 2021-12-26 RX ADMIN — Medication 600 MILLIGRAM(S): at 10:15

## 2021-12-26 RX ADMIN — ENOXAPARIN SODIUM 40 MILLIGRAM(S): 100 INJECTION SUBCUTANEOUS at 09:42

## 2021-12-26 RX ADMIN — Medication 975 MILLIGRAM(S): at 06:17

## 2021-12-26 NOTE — PROGRESS NOTE ADULT - SUBJECTIVE AND OBJECTIVE BOX
Patient evaluated at bedside for clinical magnesium check.     She denies visual disturbances including scotoma, headache and right upper quadrant pain. Also denies nausea/vomiting/epigastric pain/shortness of breath. Pain well controlled.      T(C): 36.8 (12-23-21 @ 23:45), Max: 36.8 (12-23-21 @ 23:45)  HR: 74 (12-24-21 @ 00:00) (67 - 105)  BP: 118/68 (12-24-21 @ 00:00) (112/87 - 130/79)  RR: 19 (12-24-21 @ 00:00) (17 - 19)  SpO2: 98% (12-24-21 @ 00:00) (98% - 100%)  Wt(kg): --    Gen: NAD  Pulm: CTABL  Abd: soft, nontender, no rebound or guarding, no epigastric tenderness, liver nonpalpable +BS, fundus palpated   : Rosenberg in place  Ext: Reflexes 2+ b/l                          13.8   7.20  )-----------( 227      ( 23 Dec 2021 17:29 )             40.4     12-23    138  |  101  |  8   ----------------------------<  70  4.0   |  21<L>  |  0.82    Ca    9.4      23 Dec 2021 18:00    TPro  7.3  /  Alb  4.0  /  TBili  0.4  /  DBili  x   /  AST  85<H>  /  ALT  81<H>  /  AlkPhos  181<H>  12-23 12-23-21 @ 07:01  -  12-24-21 @ 02:31  --------------------------------------------------------  IN: 3700 mL / OUT: 1066 mL / NET: 2634 mL            >>> <<<        
Patient evaluated at bedside this morning, resting comfortable in bed.   She reports pain is well controlled.  She denies headache, dizziness, chest pain, palpitations, shortness of breathe, nausea, vomiting or heavy vaginal bleeding.  She has been ambulating without assistance, voiding spontaneously, passing gas, tolerating regular diet and is breastfeeding.    Physical Exam:  Vital Signs Last 24 Hrs  T(C): 36.6 (25 Dec 2021 05:28), Max: 36.9 (24 Dec 2021 22:24)  T(F): 97.8 (25 Dec 2021 05:28), Max: 98.5 (24 Dec 2021 22:24)  HR: 76 (25 Dec 2021 05:28) (63 - 100)  BP: 122/77 (25 Dec 2021 05:28) (111/75 - 122/81)  BP(mean): --  RR: 17 (25 Dec 2021 05:28) (17 - 18)  SpO2: 98% (25 Dec 2021 05:28) (96% - 99%)    GA: NAD, A+0 x 3  Abd: + BS, soft, nontender, nondistended, no rebound or guarding, incision clean, dry and intact, uterus firm at midline and below umbilicus  : lochia WNL  Extremities: no swelling or calf tenderness   LUNGS: CTABL                          11.7   8.86  )-----------( 187      ( 24 Dec 2021 09:35 )             33.8     12-24    133<L>  |  99  |  7   ----------------------------<  75  3.7   |  26  |  0.80    Ca    7.5<L>      24 Dec 2021 09:35  Mg     6.1     12-24    TPro  5.6<L>  /  Alb  3.1<L>  /  TBili  0.4  /  DBili  x   /  AST  76<H>  /  ALT  60<H>  /  AlkPhos  129<H>  12-24      PT/INR - ( 23 Dec 2021 16:34 )   PT: 10.3 sec;   INR: 0.85          PTT - ( 23 Dec 2021 16:34 )  PTT:29.2 sec  
Patient evaluated at bedside this morning, resting comfortable in bed.   She reports pain is well controlled.  She denies headache, dizziness, chest pain, palpitations, shortness of breathe, nausea, vomiting or heavy vaginal bleeding.  She has been ambulating without assistance, voiding spontaneously, passing gas, tolerating regular diet and is breastfeeding.    Physical Exam:  Vital Signs Last 24 Hrs  T(C): 36.7 (26 Dec 2021 05:26), Max: 37 (25 Dec 2021 10:25)  T(F): 98 (26 Dec 2021 05:26), Max: 98.6 (25 Dec 2021 10:25)  HR: 76 (26 Dec 2021 05:26) (76 - 99)  BP: 119/73 (26 Dec 2021 05:26) (111/72 - 120/70)  BP(mean): --  RR: 17 (26 Dec 2021 05:26) (17 - 18)  SpO2: 97% (26 Dec 2021 05:26) (96% - 98%)    GA: NAD, A+0 x 3  Abd: + BS, soft, nontender, nondistended, no rebound or guarding, incision clean, dry and intact, uterus firm at midline and below umbilicus  : lochia WNL  Extremities: no swelling or calf tenderness   Lungs: CTABL                          11.7   8.86  )-----------( 187      ( 24 Dec 2021 09:35 )             33.8     12-24    133<L>  |  99  |  7   ----------------------------<  75  3.7   |  26  |  0.80    Ca    7.5<L>      24 Dec 2021 09:35  Mg     6.1     12-24    TPro  5.6<L>  /  Alb  3.1<L>  /  TBili  0.4  /  DBili  x   /  AST  76<H>  /  ALT  60<H>  /  AlkPhos  129<H>  12-24        
Patient evaluated at bedside for clinical magnesium check and morning rounds.    She denies visual disturbances including scotoma, headache and right upper quadrant pain. Also denies nausea/vomiting/epigastric pain/shortness of breath. Pain well controlled.      T(C): 36.7 (12-24-21 @ 06:54), Max: 36.8 (12-23-21 @ 23:45)  HR: 63 (12-24-21 @ 06:54) (63 - 89)  BP: 135/85 (12-24-21 @ 06:54) (112/87 - 148/86)  RR: 18 (12-24-21 @ 06:54) (17 - 19)  SpO2: 98% (12-24-21 @ 06:54) (97% - 100%)  Wt(kg): --    Gen: NAD  Pulm: CTAB  Abd: soft, nontender, no rebound or guarding, no epigastric tenderness, liver nonpalpable +BS, fundus palpated   : Rosenberg in place  Ext: Reflexes +2                          11.7   8.86  )-----------( 187      ( 24 Dec 2021 09:35 )             33.8     12-23    138  |  101  |  8   ----------------------------<  70  4.0   |  21<L>  |  0.82    Ca    9.4      23 Dec 2021 18:00  Mg     4.5     12-24    TPro  7.3  /  Alb  4.0  /  TBili  0.4  /  DBili  x   /  AST  85<H>  /  ALT  81<H>  /  AlkPhos  181<H>  12-23 12-23-21 @ 07:01  -  12-24-21 @ 07:00  --------------------------------------------------------  IN: 4575 mL / OUT: 2466 mL / NET: 2109 mL

## 2021-12-26 NOTE — DISCHARGE NOTE OB - CARE PLAN
Principal Discharge DX:	S/P  section  Assessment and plan of treatment:	Take Motrin 600mg every 6 hours and/or tylenol 650mg every 6 hours as needed for pain. Call your Doctor  to schedule a follow up appointment for 2 weeks. Call your Doctor if you experience severe abdominal pain not improved by oral pain medications, heavy bright red vaginal bleeding saturating more than 1 pad per hour, or fever greater than 100.4F.  Secondary Diagnosis:	Preeclampsia, third trimester  Assessment and plan of treatment:	Follow up within your OB in one week for a blood pressure check. Check bp 3x a day. If blood pressure greater than 160/110, you develop a headache not relieved by tylenol, visual disturbances, or right upper abdominal pain, call your doctor or the hospital, or go to your nearest emergency room. Regular diet, normal activity, nothing in the vagina for 6 weeks--no sex, tampons, tub baths, or swimming pools.   1

## 2021-12-26 NOTE — DISCHARGE NOTE OB - PATIENT PORTAL LINK FT
You can access the FollowMyHealth Patient Portal offered by Health system by registering at the following website: [email or web address]. By joining [facility]’s FollowMyHealth portal, you will also be able to view your health information using other applications (apps) compatible with our system.

## 2021-12-26 NOTE — PROGRESS NOTE ADULT - ASSESSMENT
A/P: 36y s/p  section c/b COVID, POD #2, stable  -  Covid: asymptomatic, CTABL, ctm  -  Pain: PO motrin/acetaminophen, oxycodone for severe pain PRN  -  Post-operatively labs: post-op Hgb stable  -  GI: tolerating regular diet, passing gas  -  : voiding spontaneously  -  DVT prophylaxis: ambulation, SCDs, Lovenox  -  Dispo: POD 3 or 4 A/P: 36y s/p  section c/bPEC w/ SF and COVID, POD #2, stable  -  PEC w/ SF: normotensive, no toxic complaints, ctm  -  Covid: asymptomatic, CTABL, ctm  -  Pain: PO motrin/acetaminophen, oxycodone for severe pain PRN  -  Post-operatively labs: post-op Hgb stable  -  GI: tolerating regular diet, passing gas  -  : voiding spontaneously  -  DVT prophylaxis: ambulation, SCDs, Lovenox  -  Dispo: POD 3 or 4

## 2021-12-26 NOTE — DISCHARGE NOTE OB - PLAN OF CARE
Follow up within your OB in one week for a blood pressure check. Check bp 3x a day. If blood pressure greater than 160/110, you develop a headache not relieved by tylenol, visual disturbances, or right upper abdominal pain, call your doctor or the hospital, or go to your nearest emergency room. Regular diet, normal activity, nothing in the vagina for 6 weeks--no sex, tampons, tub baths, or swimming pools. Take Motrin 600mg every 6 hours and/or tylenol 650mg every 6 hours as needed for pain. Call your Doctor  to schedule a follow up appointment for 2 weeks. Call your Doctor if you experience severe abdominal pain not improved by oral pain medications, heavy bright red vaginal bleeding saturating more than 1 pad per hour, or fever greater than 100.4F.

## 2021-12-26 NOTE — DISCHARGE NOTE OB - CARE PROVIDER_API CALL
Chio Schrader)  Obstetrics and Gynecology  [address] [place] [address]  Phone: [phone]  Fax: [phone]  Follow Up Time:

## 2021-12-26 NOTE — DISCHARGE NOTE OB - HOSPITAL COURSE
pC/S c/b PEC w/SF, s/p IV mag, no antihypertensive needed. Covid +.    Patient stable at time of discharge. Patient ambulating and tolerating PO. Met all postpartum milestones. Normotensive.

## 2021-12-26 NOTE — DISCHARGE NOTE OB - NS MD DC FALL RISK RISK
For information on Fall & Injury Prevention, visit: [email or web address] OR  [email or web address] OR  https://www.cdc.gov/steadi/patient.html

## 2021-12-30 DIAGNOSIS — Z3A.40 40 WEEKS GESTATION OF PREGNANCY: ICD-10-CM

## 2021-12-30 DIAGNOSIS — U07.1 COVID-19: ICD-10-CM

## 2022-01-13 LAB — SURGICAL PATHOLOGY STUDY: SIGNIFICANT CHANGE UP

## 2022-04-13 ENCOUNTER — APPOINTMENT (OUTPATIENT)
Dept: PULMONOLOGY | Facility: CLINIC | Age: 37
End: 2022-04-13

## 2023-05-06 ENCOUNTER — NON-APPOINTMENT (OUTPATIENT)
Age: 38
End: 2023-05-06

## 2023-05-15 ENCOUNTER — OUTPATIENT (OUTPATIENT)
Dept: OUTPATIENT SERVICES | Facility: HOSPITAL | Age: 38
LOS: 1 days | End: 2023-05-15
Payer: COMMERCIAL

## 2023-05-15 ENCOUNTER — APPOINTMENT (OUTPATIENT)
Dept: RADIOLOGY | Facility: CLINIC | Age: 38
End: 2023-05-15
Payer: COMMERCIAL

## 2023-05-15 DIAGNOSIS — Z00.8 ENCOUNTER FOR OTHER GENERAL EXAMINATION: ICD-10-CM

## 2023-05-15 PROCEDURE — 72070 X-RAY EXAM THORAC SPINE 2VWS: CPT | Mod: 26

## 2023-05-15 PROCEDURE — 72100 X-RAY EXAM L-S SPINE 2/3 VWS: CPT | Mod: 26

## 2023-05-15 PROCEDURE — 72070 X-RAY EXAM THORAC SPINE 2VWS: CPT

## 2023-05-15 PROCEDURE — 72100 X-RAY EXAM L-S SPINE 2/3 VWS: CPT
